# Patient Record
Sex: MALE | Race: WHITE | Employment: FULL TIME | URBAN - METROPOLITAN AREA
[De-identification: names, ages, dates, MRNs, and addresses within clinical notes are randomized per-mention and may not be internally consistent; named-entity substitution may affect disease eponyms.]

---

## 2019-10-21 ENCOUNTER — APPOINTMENT (OUTPATIENT)
Dept: GENERAL RADIOLOGY | Age: 49
End: 2019-10-21
Attending: FAMILY MEDICINE
Payer: COMMERCIAL

## 2019-10-21 ENCOUNTER — HOSPITAL ENCOUNTER (OUTPATIENT)
Age: 49
Discharge: HOME | End: 2019-10-21
Attending: FAMILY MEDICINE
Payer: COMMERCIAL

## 2019-10-21 VITALS
SYSTOLIC BLOOD PRESSURE: 141 MMHG | DIASTOLIC BLOOD PRESSURE: 96 MMHG | RESPIRATION RATE: 16 BRPM | HEART RATE: 108 BPM | OXYGEN SATURATION: 96 % | TEMPERATURE: 100 F

## 2019-10-21 DIAGNOSIS — J18.9 COMMUNITY ACQUIRED PNEUMONIA OF LEFT UPPER LOBE OF LUNG: Primary | ICD-10-CM

## 2019-10-21 PROCEDURE — 99203 OFFICE O/P NEW LOW 30 MIN: CPT

## 2019-10-21 PROCEDURE — 71046 X-RAY EXAM CHEST 2 VIEWS: CPT | Performed by: FAMILY MEDICINE

## 2019-10-21 PROCEDURE — 99204 OFFICE O/P NEW MOD 45 MIN: CPT

## 2019-10-21 RX ORDER — ALBUTEROL SULFATE 90 UG/1
2 AEROSOL, METERED RESPIRATORY (INHALATION) EVERY 6 HOURS PRN
Qty: 1 INHALER | Refills: 0 | Status: SHIPPED | OUTPATIENT
Start: 2019-10-21

## 2019-10-21 RX ORDER — LEVOFLOXACIN 500 MG/1
500 TABLET, FILM COATED ORAL DAILY
Qty: 10 TABLET | Refills: 0 | Status: SHIPPED | OUTPATIENT
Start: 2019-10-21 | End: 2019-10-31

## 2019-10-21 NOTE — ED INITIAL ASSESSMENT (HPI)
Pt sts cough/cold symptoms for the past 6 months- symptoms improve but never fully resolve. Yesterday noted cough was worse- non productive, tight, SOB this morning,  Fever as high as 100.7, chills.

## 2019-10-21 NOTE — ED PROVIDER NOTES
Patient Seen in: [id number] [facility]      History   Patient presents with:  Cough/URI    Stated Complaint: cough/chest congestion    HPI    51-year-old male presents for cough, congestion and shortness of breath.   Patient states he has chroni normal.      Nose: Nose normal.      Mouth/Throat:      Pharynx: Uvula midline. Eyes:      General: Lids are normal.      Conjunctiva/sclera: Conjunctivae normal.      Pupils: Pupils are equal, round, and reactive to light.    Neck:      Musculoskeletal: Wheezing., Normal, Disp-1 Inhaler, R-0

## 2024-11-19 NOTE — PROGRESS NOTES
FAMILY MEDICINE CLINIC NOTE    HPI  Austin Batres is a 54 year old male presenting for physical    #Health Maintenance  -Diet: A lot of fast food - eats on the road a lot.   -Exercise: Not much.   -Lung cancer screen: Not indicated  -Colon cancer screen: Indicated - desires cologuard  -Prostate cancer screen: Discussion held with patient. Declined screen.  -Aortic aneurysm screen: Not indicated  -Statin:  Will check lipid panel  -ASA: Not indicated  -HIV screen: Indicated  -Hep C screen: Indicated  -Gonorrhea/chlamydia:  Not indicated  -Syphillis: Not indicated  -TB: Not indicated  -Tobacco/alcohol: Per below  -Depression: PHQ-2 score of 0 (score >/= 3 do PHQ-9)  -Advanced Directive: Indicated    #Immunizations  -Tdap: Indicated  -Flu shot: Indicated - declines  -PCV13: Not indicated   -PCV20: Not indicated   -PPSV23: Not indicated   -HPV: Not indicated  -RZV (preferred) or VZL: Indicated - defers    -RSV: Not indicated   -COVID: Indicated    #Shin lesion  -left side  -2 years  -itchy  -scratching it  -no new soaps, lotions, detergents  -had lesion on left arm in the past    #Elevated BP  -feels well, no issues  -no HA  -no CP, no SOB    #Patient Care Team  Patient Care Team:  Flash Rodriguez MD as PCP - General (Family Medicine)    ROS  GENERAL: No fever/chills, no recent weight loss   HEENT: No visual changes, no changes in hearing, no sore throats  NECK: No pain, no swelling  RESP: No cough, no SOB  CV: No chest pain, no palpitations  GI: No abd pain, no N/V/D  MSK: No edema, no pain  SKIN: No new rashes  NEURO: No numbness, no tingling, no HA    HEALTH MAINTENANCE CHECKLIST  Health Maintenance Topics with due status: Overdue       Topic Date Due    Annual Physical Never done    Colorectal Cancer Screening Never done    DTaP,Tdap,and Td Vaccines Never done    PSA Never done    Zoster Vaccines Never done    Annual Depression Screening Never done    COVID-19 Vaccine Never done    Influenza Vaccine Never done        ALLERGIES  Allergies[1]    MEDICATIONS  Current Outpatient Medications   Medication Sig Dispense Refill    triamcinolone 0.5 % External Cream Apply 1 Application topically 2 (two) times daily. 15 g 3    losartan 50 MG Oral Tab Take 1 tablet (50 mg total) by mouth daily. 90 tablet 3       ACTIVE PROBLEM  Patient Active Problem List   Diagnosis    Psoriasis    Health maintenance examination    Numerous moles    Primary hypertension       PAST MEDICAL HISTORY  No past medical history on file.      PAST SOCIAL HISTORY  Social History     Socioeconomic History    Marital status: Single     Spouse name: Not on file    Number of children: Not on file    Years of education: Not on file    Highest education level: Not on file   Occupational History    Not on file   Tobacco Use    Smoking status: Never    Smokeless tobacco: Never   Vaping Use    Vaping status: Never Used   Substance and Sexual Activity    Alcohol use: Yes     Comment: 4-5 times a week - 1-4 drinks    Drug use: Never    Sexual activity: Not Currently     Partners: Female   Other Topics Concern    Not on file   Social History Narrative    Relationships:     Children: Dominique (17 F- competitive dancer)    Pets: 2 Cats    School: N/A    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    Origin: From the [facility]. [employer] [place] background.     Interests: Spending time with daughter    Spiritual: Not Islam, not spiritual     Social Drivers of Health     Financial Resource Strain: Not on file   Food Insecurity: Not on file   Transportation Needs: Not on file   Physical Activity: Not on file   Stress: Not on file   Social Connections: Not on file   Housing Stability: Not on file       PAST SURGICAL HISTORY  Past Surgical History:   Procedure Laterality Date    Other Right 1982    right kidney repair- pt sts it was ripped in half after sledding accident.       PAST FAMILY HISTORY  Family History   Problem Relation Age of Onset    Other  (Sarcoidosis) Mother     Cancer Mother         Thyroid    Heart Attack Father     Heart Attack Brother     Hypertension Brother     Hyperlipidemia Brother     No Known Problems Brother     No Known Problems Maternal Grandmother     No Known Problems Maternal Grandfather     No Known Problems Paternal Grandmother     Dementia Paternal Grandfather     Colon Cancer Neg     Prostate Cancer Neg        PHYSICAL EXAM  Vitals:    11/20/24 1248   BP: (!) 148/98   Pulse: 80   Temp: 98.4 °F (36.9 °C)   SpO2: 98%   Weight: 157 lb (71.2 kg)   Height: 5' 11\" (1.803 m)      Body mass index is 21.9 kg/m².    GENERAL: NAD  HEENT: Moist mucous membranes, no tonsillar swelling or erythema, PERRLA bilat, TM translucent and non-bulging  NECK: Supple, non-tender  RESP: CTAB, no wheezing, no rales, no rhonchi  CV: RRR, no murmurs  GI: Soft, non-distended, non-tender, no guarding, no rebound, no masses  MSK: No edema  SKIN: Brown macules seen throughout the torso and extremities.  On the left shin there is a pink excoriated dry scaly plaque  NEURO: Answering questions appropriately    LABS  No results found for: \"WBC\", \"HGB\", \"HCT\", \"PLT\", \"NEPERCENT\", \"LYPERCENT\", \"MOPERCENT\", \"EOPERCENT\", \"BAPERCENT\", \"NE\", \"LYMABS\", \"MOABSO\", \"EOABSO\", \"BAABSO\", \"REITCPERCENT\"    No results found for: \"NA\", \"K\", \"CL\", \"CO2\", \"ANIONGAP\", \"BUN\", \"CREATSERUM\", \"BUNCREA\", \"GLU\", \"CA\", \"OSMOCALC\", \"GFRNAA\", \"GFRAA\", \"ALT\", \"AST\", \"ALKPHO\", \"BILT\", \"TP\", \"ALB\", \"GLOBULIN\", \"ELECTAG\", \"FASTING\"      No results found for: \"CHOLEST\", \"TRIG\", \"HDL\", \"LDL\", \"VLDL\", \"TCHDLRATIO\", \"NONHDLC\", \"CHOLHDLRATIO\", \"CALCNONHDL\"     DIAGNOSTICS    ASSESSMENT/PLAN  Problem List Items Addressed This Visit          Cardiac and Vasculature    Primary hypertension     Multiple markedly elevated blood pressure readings in the office.  This is consistent with hypertension  Start losartan 50 mg daily.  Monitor blood pressures at home.  If blood pressure after 5 days if still greater  than 140/90 increase to 100 mg daily.  Check labs including CBC, CMP, UA.  Will need baseline EKG at next office visit.         Relevant Medications    losartan 50 MG Oral Tab    Other Relevant Orders    Urinalysis with Culture Reflex [E]       Skin    Numerous moles     Patient with numerous moles and lentigo seen throughout the body.    Advise regular usage of sunscreen  Referral to dermatology for full skin examination.         Relevant Medications    triamcinolone 0.5 % External Cream    Other Relevant Orders    DERM - INTERNAL    Psoriasis     Patient with dry scaly plaque on left shin.   Seems consistent with psoriasis  Start triamcinolone 0.5% cream.  Moisturize with CeraVe.  Follow-up as needed.         Relevant Medications    triamcinolone 0.5 % External Cream       Health Encounters    Health maintenance examination - Primary     Exercise and diet advised.  CBC, CMP, lipid panel, Hba1c, TSH, HIV screen, hepatitis C screen  Tdap today.  Shingles vaccine - deferred  Flu shot declined  COVID vaccine advised.  Advanced directive information provided.  Cologuard ordered         Relevant Orders    TETANUS, DIPHTHERIA TOXOIDS AND ACELLULAR PERTUSIS VACCINE (TDAP), >7 YEARS, IM USE    CBC With Differential With Platelet    Comp Metabolic Panel (14)    HCV Antibody    Hemoglobin A1C    Lipid Panel    TSH W Reflex To Free T4    HIV Ag/Ab Combo     Other Visit Diagnoses       Colon cancer screening        Relevant Orders    COLOGUARD COLON CANCER SCREENING (EXTERNAL)            Return in about 2 weeks (around 12/4/2024) for follow up.    Flash Rodriguez MD  Family Medicine         [1] No Known Allergies

## 2024-11-20 ENCOUNTER — OFFICE VISIT (OUTPATIENT)
Dept: INTERNAL MEDICINE CLINIC | Facility: CLINIC | Age: 54
End: 2024-11-20
Payer: COMMERCIAL

## 2024-11-20 VITALS
HEART RATE: 80 BPM | BODY MASS INDEX: 21.98 KG/M2 | TEMPERATURE: 98 F | WEIGHT: 157 LBS | HEIGHT: 71 IN | DIASTOLIC BLOOD PRESSURE: 104 MMHG | SYSTOLIC BLOOD PRESSURE: 160 MMHG | OXYGEN SATURATION: 98 %

## 2024-11-20 DIAGNOSIS — Z00.00 HEALTH MAINTENANCE EXAMINATION: Primary | ICD-10-CM

## 2024-11-20 DIAGNOSIS — Z12.11 COLON CANCER SCREENING: ICD-10-CM

## 2024-11-20 DIAGNOSIS — L40.9 PSORIASIS: ICD-10-CM

## 2024-11-20 DIAGNOSIS — D22.9 NUMEROUS MOLES: ICD-10-CM

## 2024-11-20 DIAGNOSIS — I10 PRIMARY HYPERTENSION: ICD-10-CM

## 2024-11-20 PROCEDURE — 90471 IMMUNIZATION ADMIN: CPT | Performed by: FAMILY MEDICINE

## 2024-11-20 PROCEDURE — 90715 TDAP VACCINE 7 YRS/> IM: CPT | Performed by: FAMILY MEDICINE

## 2024-11-20 PROCEDURE — 99386 PREV VISIT NEW AGE 40-64: CPT | Performed by: FAMILY MEDICINE

## 2024-11-20 RX ORDER — TRIAMCINOLONE ACETONIDE 5 MG/G
1 CREAM TOPICAL 2 TIMES DAILY
Qty: 15 G | Refills: 3 | Status: SHIPPED | OUTPATIENT
Start: 2024-11-20

## 2024-11-20 RX ORDER — LOSARTAN POTASSIUM 50 MG/1
50 TABLET ORAL DAILY
Qty: 90 TABLET | Refills: 3 | Status: SHIPPED | OUTPATIENT
Start: 2024-11-20

## 2024-11-20 NOTE — PATIENT INSTRUCTIONS
PATIENT INSTRUCTIONS    Thank you for seeing me today, it was a pleasure taking care of you.  Please check out at the  and schedule a follow up appointment.  Return in about 2 weeks (around 12/4/2024) for follow up.  Please remember that the preferred vivi period for appointments is 5 minutes. This is to help maximize the amount of time that we can spend together at our visits.    Please get your labs drawn - you may need to schedule a lab appointment if this was not completed at your recent doctor's visit.  The following imaging studies were ordered: None  Please also follow up with the following specialists: Dermatology  Please fill out the advance directive information (power of  documents) and bring a copy to our clinic.  Diet and exercise  Cut back on salt  Losartan 50 mg  For the first 2 days, take losartan 25 mg daily - cut one in half.   After 2 days, increase to full 50 mg daily for another 5 days.  Monitor your blood pressures at home  If blood pressure >140/90 after 5 days, increase losartan to 100 mg daily   Please monitor your blood pressures at home using a blood pressure machine with a cuff that goes over your arm (not your wrist). If you do not have a blood pressure machine, you can consider purchasing an [employer] blood pressure machine (available on [employer]).   Please check your blood pressures at once a day and record your blood pressures in a log.   Please bring your blood pressure log to your next doctor's appointment with me.  Please also bring your blood pressure machine to your next doctor's appointment to see if it is accurate.  Triamcinolone cream for your shin  Moisturize regularly with Cerave  Cologuard stool test      Dr. Michael Rivers

## 2024-11-20 NOTE — ASSESSMENT & PLAN NOTE
Multiple markedly elevated blood pressure readings in the office.  This is consistent with hypertension  Start losartan 50 mg daily.  Monitor blood pressures at home.  If blood pressure after 5 days if still greater than 140/90 increase to 100 mg daily.  Check labs including CBC, CMP, UA.  Will need baseline EKG at next office visit.

## 2024-11-20 NOTE — ASSESSMENT & PLAN NOTE
Patient with numerous moles and lentigo seen throughout the body.    Advise regular usage of sunscreen  Referral to dermatology for full skin examination.

## 2024-11-20 NOTE — ASSESSMENT & PLAN NOTE
Patient with dry scaly plaque on left shin.   Seems consistent with psoriasis  Start triamcinolone 0.5% cream.  Moisturize with CeraVe.  Follow-up as needed.

## 2024-11-20 NOTE — ASSESSMENT & PLAN NOTE
Exercise and diet advised.  CBC, CMP, lipid panel, Hba1c, TSH, HIV screen, hepatitis C screen  Tdap today.  Shingles vaccine - deferred  Flu shot declined  COVID vaccine advised.  Advanced directive information provided.  Cologuard ordered

## 2024-11-21 LAB
APPEARANCE: CLEAR
BILIRUBIN: NEGATIVE
COLOR: YELLOW
GLUCOSE: NEGATIVE
KETONES: NEGATIVE
LEUKOCYTE ESTERASE: NEGATIVE
NITRITE: NEGATIVE
OCCULT BLOOD: NEGATIVE
PH: 7 (ref 5–8)
PROTEIN: NEGATIVE
SPECIFIC GRAVITY: 1.01 (ref 1–1.03)

## 2024-11-23 LAB
ABSOLUTE BASOPHILS: 49 CELLS/UL (ref 0–200)
ABSOLUTE EOSINOPHILS: 119 CELLS/UL (ref 15–500)
ABSOLUTE LYMPHOCYTES: 1463 CELLS/UL (ref 850–3900)
ABSOLUTE MONOCYTES: 847 CELLS/UL (ref 200–950)
ABSOLUTE NEUTROPHILS: 4522 CELLS/UL (ref 1500–7800)
ALBUMIN/GLOBULIN RATIO: 1.5 (CALC) (ref 1–2.5)
ALBUMIN: 4.1 G/DL (ref 3.6–5.1)
ALKALINE PHOSPHATASE: 60 U/L (ref 35–144)
ALT: 10 U/L (ref 9–46)
AST: 19 U/L (ref 10–35)
BASOPHILS: 0.7 %
BILIRUBIN, TOTAL: 0.5 MG/DL (ref 0.2–1.2)
BUN: 11 MG/DL (ref 7–25)
CALCIUM: 9.4 MG/DL (ref 8.6–10.3)
CARBON DIOXIDE: 26 MMOL/L (ref 20–32)
CHLORIDE: 101 MMOL/L (ref 98–110)
CHOL/HDLC RATIO: 2.9 (CALC)
CHOLESTEROL, TOTAL: 204 MG/DL
CREATININE: 0.97 MG/DL (ref 0.7–1.3)
EGFR: 93 ML/MIN/1.73M2
EOSINOPHILS: 1.7 %
GLOBULIN: 2.7 G/DL (CALC) (ref 1.9–3.7)
GLUCOSE: 104 MG/DL (ref 65–99)
HDL CHOLESTEROL: 71 MG/DL
HEMATOCRIT: 42.3 % (ref 38.5–50)
HEMOGLOBIN A1C: 5.1 % OF TOTAL HGB
HEMOGLOBIN: 14 G/DL (ref 13.2–17.1)
LDL-CHOLESTEROL: 116 MG/DL (CALC)
LYMPHOCYTES: 20.9 %
MCH: 32.6 PG (ref 27–33)
MCHC: 33.1 G/DL (ref 32–36)
MCV: 98.6 FL (ref 80–100)
MONOCYTES: 12.1 %
MPV: 9.8 FL (ref 7.5–12.5)
NEUTROPHILS: 64.6 %
NON-HDL CHOLESTEROL: 133 MG/DL (CALC)
PLATELET COUNT: 323 THOUSAND/UL (ref 140–400)
POTASSIUM: 4.6 MMOL/L (ref 3.5–5.3)
PROTEIN, TOTAL: 6.8 G/DL (ref 6.1–8.1)
RDW: 13.7 % (ref 11–15)
RED BLOOD CELL COUNT: 4.29 MILLION/UL (ref 4.2–5.8)
SODIUM: 135 MMOL/L (ref 135–146)
TRIGLYCERIDES: 75 MG/DL
TSH W/REFLEX TO FT4: 1.99 MIU/L (ref 0.4–4.5)
WHITE BLOOD CELL COUNT: 7 THOUSAND/UL (ref 3.8–10.8)

## 2024-11-24 PROBLEM — E78.5 HYPERLIPIDEMIA: Status: ACTIVE | Noted: 2024-11-24

## 2024-12-09 ENCOUNTER — OFFICE VISIT (OUTPATIENT)
Dept: INTERNAL MEDICINE CLINIC | Facility: CLINIC | Age: 54
End: 2024-12-09
Payer: COMMERCIAL

## 2024-12-09 VITALS
DIASTOLIC BLOOD PRESSURE: 92 MMHG | HEIGHT: 71 IN | BODY MASS INDEX: 21.84 KG/M2 | TEMPERATURE: 97 F | WEIGHT: 156 LBS | SYSTOLIC BLOOD PRESSURE: 138 MMHG | OXYGEN SATURATION: 99 % | HEART RATE: 98 BPM

## 2024-12-09 DIAGNOSIS — L40.9 PSORIASIS: ICD-10-CM

## 2024-12-09 DIAGNOSIS — I10 PRIMARY HYPERTENSION: Primary | ICD-10-CM

## 2024-12-09 DIAGNOSIS — E78.00 PURE HYPERCHOLESTEROLEMIA: ICD-10-CM

## 2024-12-09 PROCEDURE — 99214 OFFICE O/P EST MOD 30 MIN: CPT | Performed by: FAMILY MEDICINE

## 2024-12-09 RX ORDER — TRIAMCINOLONE ACETONIDE 5 MG/G
1 CREAM TOPICAL 2 TIMES DAILY
Qty: 15 G | Refills: 3 | Status: SHIPPED | OUTPATIENT
Start: 2024-12-09

## 2024-12-09 NOTE — ASSESSMENT & PLAN NOTE
Patient with dry scaly plaque on left shin.   Seems consistent with psoriasis  Continue triamcinolone 0.5% cream - this is helping  Moisturize with CeraVe.  Follow-up as needed.

## 2024-12-09 NOTE — ASSESSMENT & PLAN NOTE
Patient with hypertension.  Increase losartan to 100 mg daily  Monitor blood pressures at home - his machine seems to be about 10 mm Hg high.  Advised to send me blood pressure readings after 5 days  If still high, will plan to add on hydrochlorothiazide   Will need baseline EKG at next office visit.  Follow up in 2-3 weeks

## 2024-12-09 NOTE — PROGRESS NOTES
FAMILY MEDICINE CLINIC NOTE    HPI  Austin Batres is a 54 year old male presenting for     #HTN  -losartan 50 mg  -blood pressures at home   -no HA  -no CP, no SOB    #HLD  -lifestyle modifications    #Psoriasis   -left side leg  -2 years  -itchy  -scratching it  -no new soaps, lotions, detergents  -had lesion on left arm in the past  -triamcinolone cream prescribed - has been helping   -now moisturizing           ROS  GENERAL: No fever/chills, no recent weight loss  HEENT: No visual changes, no changes in hearing, no sore throats  NECK: No pain, no swelling  RESP: No cough, no SOB  CV: No chest pain, no palpitations  GI: No abd pain, no N/V/D  MSK: No edema  SKIN: No new rashes  NEURO: No numbness, no tingling, no headaches    HEALTH MAINTENANCE  Health Maintenance Topics with due status: Overdue       Topic Date Due    Colorectal Cancer Screening Never done    PSA Never done    Zoster Vaccines Never done    COVID-19 Vaccine 09/01/2024    Influenza Vaccine Never done     Health Maintenance Topics with due status: Due Soon       Topic Date Due    HTN: BP Follow-Up 12/20/2024       ALLERGIES  Allergies[1]    MEDICATIONS  Current Outpatient Medications   Medication Sig Dispense Refill    triamcinolone 0.5 % External Cream Apply 1 Application topically 2 (two) times daily. 15 g 3    losartan 50 MG Oral Tab Take 1 tablet (50 mg total) by mouth daily. 90 tablet 3       ACTIVE PROBLEMS  Patient Active Problem List   Diagnosis    Psoriasis    Health maintenance examination    Numerous moles    Primary hypertension    Hyperlipidemia       PAST MEDICAL HISTORY  Past Medical History:    Hyperlipidemia       PAST SOCIAL HISTORY  Social History     Socioeconomic History    Marital status: Single     Spouse name: Not on file    Number of children: Not on file    Years of education: Not on file    Highest education level: Not on file   Occupational History    Not on file   Tobacco Use    Smoking status: Never    Smokeless  tobacco: Never   Vaping Use    Vaping status: Never Used   Substance and Sexual Activity    Alcohol use: Yes     Comment: 4-5 times a week - 1-4 drinks    Drug use: Never    Sexual activity: Not Currently     Partners: Female   Other Topics Concern    Not on file   Social History Narrative    Relationships:     Children: Dominique (17 F- competitive dancer)    Pets: 2 Cats    School: N/A    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    Origin: From the [facility]. [place] background.     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       PAST FAMILY HISTORY  Family History   Problem Relation Age of Onset    Other (Sarcoidosis) Mother     Cancer Mother         Thyroid    Heart Attack Father     Heart Attack Brother     Hypertension Brother     Hyperlipidemia Brother     No Known Problems Brother     No Known Problems Maternal Grandmother     No Known Problems Maternal Grandfather     No Known Problems Paternal Grandmother     Dementia Paternal Grandfather     Colon Cancer Neg     Prostate Cancer Neg          PHYSICAL EXAM  Vitals:    12/09/24 1520 12/09/24 1529   BP: (!) 152/96 (!) 138/92   Pulse: 98    Temp: 97.4 °F (36.3 °C)    SpO2: 99%    Weight: 156 lb (70.8 kg)    Height: 5' 11\" (1.803 m)       Body mass index is 21.76 kg/m².    GENERAL: NAD  NECK: Supple, non-tender  RESP: Non-labored respirations, CTAB, no wheezing, no rales, no rhonchi  CV: RRR, no murmurs  NEURO: Answering questions appropriately    LABS  Lab Results    Component Value Date    WBC 7.0 2024    HGB 14.0 2024    HCT 42.3 2024     2024    NEPERCENT 64.6 2024    LYPERCENT 20.9 2024    MOPERCENT 12.1 2024    EOPERCENT 1.7 2024    BAPERCENT 0.7 2024    NE 4,522 2024    LYMABS 1,463 2024    MOABSO 847 2024    EOABSO 119 2024    BAABSO 49 2024       Lab Results   Component Value Date     2024    K 4.6 2024     2024    CO2 26 2024    BUN 11 2024    CREATSERUM 0.97 2024    BUNCREA SEE NOTE: 2024     (H) 2024    CA 9.4 2024    ALT 10 2024    AST 19 2024    ALKPHO 60 2024    BILT 0.5 2024    TP 6.8 2024    ALB 4.1 2024    GLOBULIN 2.7 2024         Lab Results   Component Value Date    CHOLEST 204 (H) 2024    TRIG 75 2024    HDL 71 2024     (H) 2024    TCHDLRATIO 2.9 2024    NONHDLC 133 (H) 2024        DIAGNOSTICS      ASSESSMENT/PLAN  Problem List Items Addressed This Visit          Cardiac and Vasculature    Hyperlipidemia     Diet and exercise advised         Primary hypertension - Primary     Patient with hypertension.  Increase losartan to 100 mg daily  Monitor blood pressures at home - his machine seems to be about 10 mm Hg high.  Advised to send me blood pressure readings after 5 days  If still high, will plan to add on hydrochlorothiazide   Will need baseline EKG at next office visit.  Follow up in 2-3 weeks            Skin    Psoriasis     Patient with dry scaly plaque on left shin.   Seems consistent with psoriasis  Continue triamcinolone 0.5% cream - this is helping  Moisturize with CeraVe.  Follow-up as needed.         Relevant Medications    triamcinolone 0.5 % External Cream       Return in about 2 weeks (around 2024).    No topic due editable text     Flash Rodriguez MD  Family Medicine           Pre-chartin  minutes  Reviewing/obtainin minutes  Medical Exam: 3 minutes  Counseling/education: 5 minutes  Notes: 2 minutes  Referring/communicatin minutes  Care coordination: 0 minutes    My total time spent caring for the patient on the day of the encounter: 22 minutes         [1] No Known Allergies

## 2024-12-16 ENCOUNTER — PATIENT MESSAGE (OUTPATIENT)
Dept: INTERNAL MEDICINE CLINIC | Facility: CLINIC | Age: 54
End: 2024-12-16

## 2024-12-16 DIAGNOSIS — I10 PRIMARY HYPERTENSION: Primary | ICD-10-CM

## 2024-12-17 RX ORDER — HYDROCHLOROTHIAZIDE 12.5 MG/1
12.5 TABLET ORAL DAILY
Qty: 90 TABLET | Refills: 0 | Status: SHIPPED | OUTPATIENT
Start: 2024-12-17

## 2025-02-19 DIAGNOSIS — L40.9 PSORIASIS: ICD-10-CM

## 2025-02-19 DIAGNOSIS — I10 PRIMARY HYPERTENSION: ICD-10-CM

## 2025-02-19 RX ORDER — LOSARTAN POTASSIUM 50 MG/1
50 TABLET ORAL DAILY
Qty: 90 TABLET | Refills: 3 | Status: CANCELLED | OUTPATIENT
Start: 2025-02-19

## 2025-02-20 RX ORDER — LOSARTAN POTASSIUM 100 MG/1
100 TABLET ORAL DAILY
Qty: 90 TABLET | Refills: 0 | Status: SHIPPED | OUTPATIENT
Start: 2025-02-20

## 2025-02-20 RX ORDER — TRIAMCINOLONE ACETONIDE 5 MG/G
1 CREAM TOPICAL 2 TIMES DAILY
Qty: 15 G | Refills: 3 | Status: SHIPPED | OUTPATIENT
Start: 2025-02-20

## 2025-05-01 DIAGNOSIS — I10 PRIMARY HYPERTENSION: ICD-10-CM

## 2025-05-01 DIAGNOSIS — L40.9 PSORIASIS: ICD-10-CM

## 2025-05-06 RX ORDER — HYDROCHLOROTHIAZIDE 12.5 MG/1
12.5 TABLET ORAL DAILY
Qty: 90 TABLET | Refills: 0 | Status: SHIPPED | OUTPATIENT
Start: 2025-05-06

## 2025-05-06 RX ORDER — TRIAMCINOLONE ACETONIDE 5 MG/G
1 CREAM TOPICAL 2 TIMES DAILY
Qty: 15 G | Refills: 3 | Status: SHIPPED | OUTPATIENT
Start: 2025-05-06

## 2025-07-28 DIAGNOSIS — L40.9 PSORIASIS: ICD-10-CM

## 2025-07-30 RX ORDER — TRIAMCINOLONE ACETONIDE 5 MG/G
1 CREAM TOPICAL 2 TIMES DAILY
Qty: 15 G | Refills: 3 | Status: SHIPPED | OUTPATIENT
Start: 2025-07-30